# Patient Record
Sex: MALE | Race: WHITE | NOT HISPANIC OR LATINO | Employment: FULL TIME | URBAN - METROPOLITAN AREA
[De-identification: names, ages, dates, MRNs, and addresses within clinical notes are randomized per-mention and may not be internally consistent; named-entity substitution may affect disease eponyms.]

---

## 2017-09-25 ENCOUNTER — ALLSCRIPTS OFFICE VISIT (OUTPATIENT)
Dept: OTHER | Facility: OTHER | Age: 27
End: 2017-09-25

## 2017-09-25 DIAGNOSIS — J30.1 ALLERGIC RHINITIS DUE TO POLLEN: ICD-10-CM

## 2017-09-25 DIAGNOSIS — Z13.220 ENCOUNTER FOR SCREENING FOR LIPOID DISORDERS: ICD-10-CM

## 2017-10-03 ENCOUNTER — LAB CONVERSION - ENCOUNTER (OUTPATIENT)
Dept: OTHER | Facility: OTHER | Age: 27
End: 2017-10-03

## 2017-10-03 LAB
A/G RATIO (HISTORICAL): 2.2 (CALC) (ref 1–2.5)
ALBUMIN SERPL BCP-MCNC: 4.8 G/DL (ref 3.6–5.1)
ALP SERPL-CCNC: 62 U/L (ref 40–115)
ALT SERPL W P-5'-P-CCNC: 33 U/L (ref 9–46)
AST SERPL W P-5'-P-CCNC: 19 U/L (ref 10–40)
BASOPHILS # BLD AUTO: 0.3 %
BASOPHILS # BLD AUTO: 23 CELLS/UL (ref 0–200)
BILIRUB SERPL-MCNC: 0.9 MG/DL (ref 0.2–1.2)
BUN SERPL-MCNC: 14 MG/DL (ref 7–25)
BUN/CREA RATIO (HISTORICAL): NORMAL (CALC) (ref 6–22)
CALCIUM SERPL-MCNC: 9.7 MG/DL (ref 8.6–10.3)
CHLORIDE SERPL-SCNC: 102 MMOL/L (ref 98–110)
CHOLEST SERPL-MCNC: 182 MG/DL
CHOLEST/HDLC SERPL: 4.4 (CALC)
CO2 SERPL-SCNC: 29 MMOL/L (ref 20–31)
CREAT SERPL-MCNC: 0.89 MG/DL (ref 0.6–1.35)
DEPRECATED RDW RBC AUTO: 12.6 % (ref 11–15)
EGFR AFRICAN AMERICAN (HISTORICAL): 137 ML/MIN/1.73M2
EGFR-AMERICAN CALC (HISTORICAL): 118 ML/MIN/1.73M2
EOSINOPHIL # BLD AUTO: 1.7 %
EOSINOPHIL # BLD AUTO: 129 CELLS/UL (ref 15–500)
GAMMA GLOBULIN (HISTORICAL): 2.2 G/DL (CALC) (ref 1.9–3.7)
GLUCOSE (HISTORICAL): 85 MG/DL (ref 65–99)
HCT VFR BLD AUTO: 47 % (ref 38.5–50)
HDLC SERPL-MCNC: 41 MG/DL
HGB BLD-MCNC: 15.9 G/DL (ref 13.2–17.1)
LDL CHOLESTEROL (HISTORICAL): 126 MG/DL (CALC)
LYMPHOCYTES # BLD AUTO: 2447 CELLS/UL (ref 850–3900)
LYMPHOCYTES # BLD AUTO: 32.2 %
MCH RBC QN AUTO: 29.6 PG (ref 27–33)
MCHC RBC AUTO-ENTMCNC: 33.8 G/DL (ref 32–36)
MCV RBC AUTO: 87.4 FL (ref 80–100)
MONOCYTES # BLD AUTO: 661 CELLS/UL (ref 200–950)
MONOCYTES (HISTORICAL): 8.7 %
NEUTROPHILS # BLD AUTO: 4340 CELLS/UL (ref 1500–7800)
NEUTROPHILS # BLD AUTO: 57.1 %
NON-HDL-CHOL (CHOL-HDL) (HISTORICAL): 141 MG/DL (CALC)
PLATELET # BLD AUTO: 266 THOUSAND/UL (ref 140–400)
PMV BLD AUTO: 10.8 FL (ref 7.5–12.5)
POTASSIUM SERPL-SCNC: 4.3 MMOL/L (ref 3.5–5.3)
RBC # BLD AUTO: 5.38 MILLION/UL (ref 4.2–5.8)
SODIUM SERPL-SCNC: 139 MMOL/L (ref 135–146)
TOTAL PROTEIN (HISTORICAL): 7 G/DL (ref 6.1–8.1)
TRIGL SERPL-MCNC: 54 MG/DL
TSH SERPL DL<=0.05 MIU/L-ACNC: 1.61 MIU/L (ref 0.4–4.5)
WBC # BLD AUTO: 7.6 THOUSAND/UL (ref 3.8–10.8)

## 2018-01-12 VITALS
OXYGEN SATURATION: 97 % | DIASTOLIC BLOOD PRESSURE: 74 MMHG | BODY MASS INDEX: 28.91 KG/M2 | RESPIRATION RATE: 16 BRPM | HEART RATE: 68 BPM | SYSTOLIC BLOOD PRESSURE: 128 MMHG | HEIGHT: 69 IN | WEIGHT: 195.2 LBS | TEMPERATURE: 98 F

## 2019-01-21 ENCOUNTER — OFFICE VISIT (OUTPATIENT)
Dept: URGENT CARE | Age: 29
End: 2019-01-21
Payer: COMMERCIAL

## 2019-01-21 VITALS
SYSTOLIC BLOOD PRESSURE: 123 MMHG | TEMPERATURE: 100.3 F | OXYGEN SATURATION: 96 % | BODY MASS INDEX: 29.55 KG/M2 | HEART RATE: 103 BPM | DIASTOLIC BLOOD PRESSURE: 59 MMHG | RESPIRATION RATE: 20 BRPM | WEIGHT: 206.4 LBS | HEIGHT: 70 IN

## 2019-01-21 DIAGNOSIS — R68.89 FLU-LIKE SYMPTOMS: Primary | ICD-10-CM

## 2019-01-21 PROCEDURE — 99203 OFFICE O/P NEW LOW 30 MIN: CPT | Performed by: PHYSICIAN ASSISTANT

## 2019-01-21 RX ORDER — TRIAMCINOLONE ACETONIDE 55 UG/1
1 SPRAY, METERED NASAL DAILY
COMMUNITY

## 2019-01-21 RX ORDER — OSELTAMIVIR PHOSPHATE 75 MG/1
75 CAPSULE ORAL EVERY 12 HOURS SCHEDULED
Qty: 10 CAPSULE | Refills: 0 | Status: SHIPPED | OUTPATIENT
Start: 2019-01-21 | End: 2019-01-26

## 2019-01-21 RX ORDER — CETIRIZINE HYDROCHLORIDE 10 MG/1
1 TABLET ORAL DAILY
COMMUNITY

## 2019-01-21 NOTE — PROGRESS NOTES
[facility]        NAME: Kenna Wallace is a 33 y o  male  : 1990    MRN: [id number]  DATE: 2019  TIME: 6:23 PM    Assessment and Plan   Flu-like symptoms [R68 89]  1  Flu-like symptoms  oseltamivir (TAMIFLU) 75 mg capsule       In light of prior year's serious flu complications, emphasized symptoms in which to go to the ED  Patient verbalized understanding  Patient Instructions     Take Tamiflu as prescribed  Fluids and rest  Tylenol/Ibuprofen for pain/fever  Monitor for worsening symptoms   Follow up with PCP in 3-5 days  Proceed to  ER if symptoms worsen  Chief Complaint     Chief Complaint   Patient presents with    Cold Like Symptoms     Pt reports he started with a cough, achiness, chills and fever today  Took Robitussin and Motrin  Last dose of Motrin at 4:30  Wife diagnosed with flu  History of Present Illness       URI    This is a new problem  The current episode started today  The problem has been unchanged  The maximum temperature recorded prior to his arrival was 100 4 - 100 9 F  Associated symptoms include coughing (productive) and headaches  Pertinent negatives include no abdominal pain, chest pain, congestion, diarrhea, ear pain, nausea, rash, rhinorrhea, sinus pain, sneezing, sore throat, vomiting or wheezing  Treatments tried: robitussin; motrin 4:30pm  The treatment provided mild relief  Review of Systems   Review of Systems   Constitutional: Positive for chills, fatigue and fever  Negative for activity change and appetite change  HENT: Negative for congestion, dental problem, ear discharge, ear pain, facial swelling, postnasal drip, rhinorrhea, sinus pain, sinus pressure, sneezing, sore throat and trouble swallowing  Eyes: Negative for itching  Respiratory: Positive for cough (productive)  Negative for chest tightness, shortness of breath and wheezing  Cardiovascular: Negative for chest pain and palpitations     Gastrointestinal: Negative for abdominal pain, constipation, diarrhea, nausea and vomiting  Musculoskeletal: Positive for myalgias  Skin: Negative for rash  Neurological: Positive for headaches  Negative for dizziness, weakness and light-headedness  Current Medications       Current Outpatient Prescriptions:     cetirizine (ZYRTEC ALLERGY) 10 mg tablet, Take 1 tablet by mouth daily, Disp: , Rfl:     Multiple Vitamins-Minerals (MULTIVITAMIN ADULT EXTRA C PO), Take 1 tablet by mouth daily, Disp: , Rfl:     oseltamivir (TAMIFLU) 75 mg capsule, Take 1 capsule (75 mg total) by mouth every 12 (twelve) hours for 5 days, Disp: 10 capsule, Rfl: 0    Triamcinolone Acetonide (NASACORT ALLERGY 24HR) 55 MCG/ACT AERO, 1 spray into each nostril daily, Disp: , Rfl:     Current Allergies     Allergies as of 01/21/2019 - Reviewed 01/21/2019   Allergen Reaction Noted    Amoxicillin Edema 09/25/2017            The following portions of the patient's history were reviewed and updated as appropriate: allergies, current medications, past family history, past medical history, past social history, past surgical history and problem list      Past Medical History:   Diagnosis Date    Allergic        Past Surgical History:   Procedure Laterality Date    WISDOM TOOTH EXTRACTION         History reviewed  No pertinent family history  Medications have been verified  Objective   /59   Pulse 103   Temp 100 3 °F (37 9 °C)   Resp 20   Ht 5' 10" (1 778 m)   Wt 93 6 kg (206 lb 6 4 oz)   SpO2 96%   BMI 29 62 kg/m²        Physical Exam     Physical Exam   Constitutional: He is oriented to person, place, and time  He appears well-developed and well-nourished  No distress  HENT:   Head: Normocephalic  Right Ear: External ear normal    Left Ear: External ear normal    Nose: Nose normal    Mouth/Throat: Oropharynx is clear and moist  No oropharyngeal exudate     Eyes: Conjunctivae are normal    Cardiovascular: Normal rate, regular rhythm, normal heart sounds and intact distal pulses  Exam reveals no gallop and no friction rub  No murmur heard  Pulmonary/Chest: Effort normal and breath sounds normal  No respiratory distress  He has no wheezes  He has no rales  He exhibits no tenderness  Lymphadenopathy:     He has no cervical adenopathy  Neurological: He is alert and oriented to person, place, and time  Skin: Skin is warm  He is not diaphoretic  Psychiatric: He has a normal mood and affect  His behavior is normal  Judgment and thought content normal    Vitals reviewed

## 2019-01-21 NOTE — PATIENT INSTRUCTIONS
Take Tamiflu as prescribed  Fluids and rest  Tylenol/Ibuprofen for pain/fever  Monitor for worsening symptoms   Follow up with PCP in 3-5 days  Proceed to  ER if symptoms worsen  Influenza   WHAT YOU NEED TO KNOW:   Influenza (the flu) is an infection caused by the influenza virus  The flu is easily spread when an infected person coughs, sneezes, or has close contact with others  You may be able to spread the flu to others for 1 week or longer after signs or symptoms appear  DISCHARGE INSTRUCTIONS:   Call 911 for any of the following:   · You have trouble breathing, and your lips look purple or blue  · You have a seizure  Return to the emergency department if:   · You are dizzy, or you are urinating less or not at all  · You have a headache with a stiff neck, and you feel tired or confused  · You have new pain or pressure in your chest     · Your symptoms, such as shortness of breath, vomiting, or diarrhea, get worse  · Your symptoms, such as fever and coughing, seem to get better, but then get worse  Contact your healthcare provider if:   · You have new muscle pain or weakness  · You have questions or concerns about your condition or care  Medicines: You may need any of the following:  · Acetaminophen  decreases pain and fever  It is available without a doctor's order  Ask how much to take and how often to take it  Follow directions  Acetaminophen can cause liver damage if not taken correctly  · NSAIDs , such as ibuprofen, help decrease swelling, pain, and fever  This medicine is available with or without a doctor's order  NSAIDs can cause stomach bleeding or kidney problems in certain people  If you take blood thinner medicine, always ask your healthcare provider if NSAIDs are safe for you  Always read the medicine label and follow directions  · Antivirals  help fight a viral infection  · Take your medicine as directed    Contact your healthcare provider if you think your medicine is not helping or if you have side effects  Tell him or her if you are allergic to any medicine  Keep a list of the medicines, vitamins, and herbs you take  Include the amounts, and when and why you take them  Bring the list or the pill bottles to follow-up visits  Carry your medicine list with you in case of an emergency  Rest  as much as you can to help you recover  Drink liquids as directed  to help prevent dehydration  Ask how much liquid to drink each day and which liquids are best for you  Prevent the spread of influenza:   · Wash your hands often  Use soap and water  Wash your hands after you use the bathroom, change a child's diapers, or sneeze  Wash your hands before you prepare or eat food  Use gel hand cleanser when soap and water are not available  Do not touch your eyes, nose, or mouth unless you have washed your hands first            · Cover your mouth when you sneeze or cough  Cough into a tissue or the bend of your arm  · Clean shared items with a germ-killing   Clean table surfaces, doorknobs, and light switches  Do not share towels, silverware, and dishes with people who are sick  Wash bed sheets, towels, silverware, and dishes with soap and water  · Wear a mask  over your mouth and nose if you are sick or are near anyone who is sick  · Stay away from others  if you are sick  · Influenza vaccine  helps prevent influenza (flu)  Everyone older than 6 months should get a yearly influenza vaccine  Get the vaccine as soon as it is available, usually in September or October each year  Follow up with your healthcare provider as directed:  Write down your questions so you remember to ask them during your visits  © 2017 [facility] Information is for End User's use only and may not be sold, redistributed or otherwise used for commercial purposes   All illustrations and images included in CareNotes® are the copyrighted property of [employer]  or [employer] Nune[employer]  The above information is an  only  It is not intended as medical advice for individual conditions or treatments  Talk to your doctor, nurse or pharmacist before following any medical regimen to see if it is safe and effective for you

## 2019-01-21 NOTE — LETTER
January 21, 2019     Patient: Aftab Tillman   YOB: 1990   Date of Visit: 1/21/2019       To Whom It May Concern: It is my medical opinion that Seda Dodd may return to work on 1/25/2019  If you have any questions or concerns, please don't hesitate to call           Sincerely,        Sofie Mae PA-C    CC: No Recipients

## 2019-03-19 ENCOUNTER — OFFICE VISIT (OUTPATIENT)
Dept: INTERNAL MEDICINE CLINIC | Age: 29
End: 2019-03-19
Payer: COMMERCIAL

## 2019-03-19 VITALS
HEIGHT: 69 IN | HEART RATE: 80 BPM | OXYGEN SATURATION: 96 % | BODY MASS INDEX: 30.21 KG/M2 | WEIGHT: 204 LBS | TEMPERATURE: 98.8 F | DIASTOLIC BLOOD PRESSURE: 58 MMHG | SYSTOLIC BLOOD PRESSURE: 104 MMHG

## 2019-03-19 DIAGNOSIS — D22.9 CHANGE IN MOLE: Primary | ICD-10-CM

## 2019-03-19 PROCEDURE — 3008F BODY MASS INDEX DOCD: CPT | Performed by: NURSE PRACTITIONER

## 2019-03-19 PROCEDURE — 99213 OFFICE O/P EST LOW 20 MIN: CPT | Performed by: NURSE PRACTITIONER

## 2019-03-19 NOTE — ASSESSMENT & PLAN NOTE
Discussed with Dr Benji Castaneda who also  examined patient  Will advise patient to use Neosporin once or twice a day to site, and follow up with our office in about 2-3 weeks for possible removal   Discussed signs and symptoms of infection when he should report back to our office

## 2019-03-19 NOTE — PROGRESS NOTES
Assessment/Plan:    Change in mole  Discussed with Dr Amparo Raines who also  examined patient  Will advise patient to use Neosporin once or twice a day to site, and follow up with our office in about 2-3 weeks for possible removal   Discussed signs and symptoms of infection when he should report back to our office  Diagnoses and all orders for this visit:    Change in mole          Subjective:      Patient ID: Joshua Brooks is a 33 y o  male  Patient presents today with a change in his mole on his right forearm  Patient reports that there is some erythema around the mole, patient has not used anything on it or has not changed anything  Patient tries to wear sunscreen to prevent sunburn  Patient states he noticed a change as well on Sunday, it was erythematous and darker and pigment, patient denies pain or drainage from this site  Patient denies any recent bug bites  Patient denies any recent scabbing or abnormal bleeding to the site of the mole  The following portions of the patient's history were reviewed and updated as appropriate: allergies, current medications, past family history, past medical history, past social history, past surgical history and problem list     Review of Systems   Constitutional: Negative for activity change, appetite change, chills, diaphoresis and fever  HENT: Negative for congestion, ear discharge, ear pain, postnasal drip, rhinorrhea, sinus pressure, sinus pain and sore throat  Eyes: Negative for pain, discharge, itching and visual disturbance  Respiratory: Negative for cough, chest tightness, shortness of breath and wheezing  Cardiovascular: Negative for chest pain, palpitations and leg swelling  Gastrointestinal: Negative for abdominal pain, constipation, diarrhea, nausea and vomiting  Endocrine: Negative for polydipsia, polyphagia and polyuria  Genitourinary: Negative for difficulty urinating, dysuria and urgency     Musculoskeletal: Negative for arthralgias, back pain and neck pain  Change to mole on right forearm    Skin: Negative for rash and wound  Neurological: Negative for dizziness, weakness, numbness and headaches  Past Medical History:   Diagnosis Date    Allergic          Current Outpatient Medications:     cetirizine (ZYRTEC ALLERGY) 10 mg tablet, Take 1 tablet by mouth daily, Disp: , Rfl:     Multiple Vitamins-Minerals (MULTIVITAMIN ADULT EXTRA C PO), Take 1 tablet by mouth daily, Disp: , Rfl:     Triamcinolone Acetonide (NASACORT ALLERGY 24HR) 55 MCG/ACT AERO, 1 spray into each nostril daily, Disp: , Rfl:     Allergies   Allergen Reactions    Amoxicillin Edema     Annotation - [id number]: redness and swelling around ears  William Quijano Other Abdominal Pain     Seasonal- watery eyes, sinus problems, congestion         Social History   Past Surgical History:   Procedure Laterality Date    WISDOM TOOTH EXTRACTION       No family history on file  Objective:  /58 (BP Location: Left arm, Patient Position: Sitting, Cuff Size: Standard)   Pulse 80   Temp 98 8 °F (37 1 °C) (Tympanic)   Ht 5' 8 5" (1 74 m) Comment: shoes off  Wt 92 5 kg (204 lb) Comment: shoes off  SpO2 96%   BMI 30 56 kg/m²     No results found for this or any previous visit (from the past 1344 hour(s))  Physical Exam   Constitutional: He is oriented to person, place, and time  He appears well-developed and well-nourished  No distress  HENT:   Head: Normocephalic and atraumatic  Right Ear: External ear normal    Left Ear: External ear normal    Nose: Nose normal    Mouth/Throat: Oropharynx is clear and moist  No oropharyngeal exudate  Eyes: Pupils are equal, round, and reactive to light  Conjunctivae and EOM are normal  Right eye exhibits no discharge  Left eye exhibits no discharge  Neck: Normal range of motion  Neck supple  No thyromegaly present  Cardiovascular: Normal rate, regular rhythm, normal heart sounds and intact distal pulses  Exam reveals no gallop and no friction rub  No murmur heard  Pulmonary/Chest: Effort normal and breath sounds normal  No stridor  No respiratory distress  He has no wheezes  He has no rales  Abdominal: Soft  Bowel sounds are normal  He exhibits no distension  There is no tenderness  Lymphadenopathy:     He has no cervical adenopathy  Neurological: He is alert and oriented to person, place, and time  Skin: Skin is warm and dry  No rash noted  He is not diaphoretic  No erythema  Psychiatric: He has a normal mood and affect  His behavior is normal  Judgment and thought content normal    Picture was obtained of mole, reaching out to MA to attach to patients chart

## 2019-10-21 ENCOUNTER — CLINICAL SUPPORT (OUTPATIENT)
Dept: INTERNAL MEDICINE CLINIC | Age: 29
End: 2019-10-21
Payer: COMMERCIAL

## 2019-10-21 DIAGNOSIS — Z23 NEED FOR INFLUENZA VACCINATION: Primary | ICD-10-CM

## 2019-10-21 PROCEDURE — 90686 IIV4 VACC NO PRSV 0.5 ML IM: CPT

## 2019-10-21 PROCEDURE — 90471 IMMUNIZATION ADMIN: CPT

## 2020-02-27 ENCOUNTER — TELEPHONE (OUTPATIENT)
Dept: INTERNAL MEDICINE CLINIC | Age: 30
End: 2020-02-27

## 2020-05-15 ENCOUNTER — OFFICE VISIT (OUTPATIENT)
Dept: INTERNAL MEDICINE CLINIC | Age: 30
End: 2020-05-15
Payer: COMMERCIAL

## 2020-05-15 VITALS
BODY MASS INDEX: 30.78 KG/M2 | OXYGEN SATURATION: 98 % | SYSTOLIC BLOOD PRESSURE: 126 MMHG | TEMPERATURE: 97.9 F | HEIGHT: 69 IN | DIASTOLIC BLOOD PRESSURE: 82 MMHG | WEIGHT: 207.8 LBS | HEART RATE: 98 BPM

## 2020-05-15 DIAGNOSIS — R53.83 FATIGUE, UNSPECIFIED TYPE: ICD-10-CM

## 2020-05-15 DIAGNOSIS — Z82.49 FAMILY HISTORY OF CARDIOVASCULAR DISEASE: ICD-10-CM

## 2020-05-15 DIAGNOSIS — F41.9 ANXIETY: ICD-10-CM

## 2020-05-15 DIAGNOSIS — Z00.00 PHYSICAL EXAM: Primary | ICD-10-CM

## 2020-05-15 DIAGNOSIS — Z13.220 SCREENING CHOLESTEROL LEVEL: ICD-10-CM

## 2020-05-15 PROCEDURE — 99395 PREV VISIT EST AGE 18-39: CPT | Performed by: FAMILY MEDICINE

## 2020-05-22 ENCOUNTER — APPOINTMENT (OUTPATIENT)
Dept: LAB | Age: 30
End: 2020-05-22
Payer: COMMERCIAL

## 2020-05-22 DIAGNOSIS — Z82.49 FAMILY HISTORY OF CARDIOVASCULAR DISEASE: ICD-10-CM

## 2020-05-22 DIAGNOSIS — R53.83 FATIGUE, UNSPECIFIED TYPE: ICD-10-CM

## 2020-05-22 DIAGNOSIS — F41.9 ANXIETY: ICD-10-CM

## 2020-05-22 DIAGNOSIS — Z13.220 SCREENING CHOLESTEROL LEVEL: ICD-10-CM

## 2020-05-22 DIAGNOSIS — Z00.00 PHYSICAL EXAM: ICD-10-CM

## 2020-05-22 LAB
25(OH)D3 SERPL-MCNC: 24 NG/ML (ref 30–100)
ALBUMIN SERPL BCP-MCNC: 4.1 G/DL (ref 3.5–5)
ALP SERPL-CCNC: 76 U/L (ref 46–116)
ALT SERPL W P-5'-P-CCNC: 57 U/L (ref 12–78)
ANION GAP SERPL CALCULATED.3IONS-SCNC: 3 MMOL/L (ref 4–13)
AST SERPL W P-5'-P-CCNC: 20 U/L (ref 5–45)
BILIRUB SERPL-MCNC: 0.7 MG/DL (ref 0.2–1)
BUN SERPL-MCNC: 12 MG/DL (ref 5–25)
CALCIUM SERPL-MCNC: 8.9 MG/DL (ref 8.3–10.1)
CHLORIDE SERPL-SCNC: 104 MMOL/L (ref 100–108)
CHOLEST SERPL-MCNC: 195 MG/DL (ref 50–200)
CO2 SERPL-SCNC: 30 MMOL/L (ref 21–32)
CREAT SERPL-MCNC: 0.9 MG/DL (ref 0.6–1.3)
GFR SERPL CREATININE-BSD FRML MDRD: 116 ML/MIN/1.73SQ M
GLUCOSE P FAST SERPL-MCNC: 99 MG/DL (ref 65–99)
HDLC SERPL-MCNC: 36 MG/DL
LDLC SERPL CALC-MCNC: 142 MG/DL (ref 0–100)
NONHDLC SERPL-MCNC: 159 MG/DL
POTASSIUM SERPL-SCNC: 3.5 MMOL/L (ref 3.5–5.3)
PROT SERPL-MCNC: 7.3 G/DL (ref 6.4–8.2)
SODIUM SERPL-SCNC: 137 MMOL/L (ref 136–145)
TRIGL SERPL-MCNC: 86 MG/DL
TSH SERPL DL<=0.05 MIU/L-ACNC: 1.71 UIU/ML (ref 0.36–3.74)

## 2020-05-22 PROCEDURE — 82306 VITAMIN D 25 HYDROXY: CPT

## 2020-05-22 PROCEDURE — 80061 LIPID PANEL: CPT

## 2020-05-22 PROCEDURE — 36415 COLL VENOUS BLD VENIPUNCTURE: CPT

## 2020-05-22 PROCEDURE — 80053 COMPREHEN METABOLIC PANEL: CPT

## 2020-05-22 PROCEDURE — 84443 ASSAY THYROID STIM HORMONE: CPT

## 2020-06-22 ENCOUNTER — OFFICE VISIT (OUTPATIENT)
Dept: INTERNAL MEDICINE CLINIC | Age: 30
End: 2020-06-22
Payer: COMMERCIAL

## 2020-06-22 VITALS
HEIGHT: 69 IN | BODY MASS INDEX: 31.55 KG/M2 | OXYGEN SATURATION: 98 % | DIASTOLIC BLOOD PRESSURE: 82 MMHG | WEIGHT: 213 LBS | TEMPERATURE: 98.5 F | HEART RATE: 86 BPM | SYSTOLIC BLOOD PRESSURE: 122 MMHG

## 2020-06-22 DIAGNOSIS — F41.9 ANXIETY: ICD-10-CM

## 2020-06-22 DIAGNOSIS — E66.09 CLASS 1 OBESITY DUE TO EXCESS CALORIES WITHOUT SERIOUS COMORBIDITY WITH BODY MASS INDEX (BMI) OF 31.0 TO 31.9 IN ADULT: ICD-10-CM

## 2020-06-22 DIAGNOSIS — E78.00 HYPERCHOLESTEREMIA: Primary | ICD-10-CM

## 2020-06-22 DIAGNOSIS — E55.9 VITAMIN D DEFICIENCY: ICD-10-CM

## 2020-06-22 PROBLEM — E66.811 CLASS 1 OBESITY DUE TO EXCESS CALORIES WITHOUT SERIOUS COMORBIDITY WITH BODY MASS INDEX (BMI) OF 31.0 TO 31.9 IN ADULT: Status: ACTIVE | Noted: 2020-06-22

## 2020-06-22 PROCEDURE — 1036F TOBACCO NON-USER: CPT | Performed by: FAMILY MEDICINE

## 2020-06-22 PROCEDURE — 3008F BODY MASS INDEX DOCD: CPT | Performed by: FAMILY MEDICINE

## 2020-06-22 PROCEDURE — 99214 OFFICE O/P EST MOD 30 MIN: CPT | Performed by: FAMILY MEDICINE

## 2021-04-05 DIAGNOSIS — Z23 ENCOUNTER FOR IMMUNIZATION: ICD-10-CM

## 2021-04-13 ENCOUNTER — IMMUNIZATIONS (OUTPATIENT)
Dept: FAMILY MEDICINE CLINIC | Facility: HOSPITAL | Age: 31
End: 2021-04-13

## 2021-04-13 DIAGNOSIS — Z23 ENCOUNTER FOR IMMUNIZATION: Primary | ICD-10-CM

## 2021-04-13 PROCEDURE — 91300 SARS-COV-2 / COVID-19 MRNA VACCINE (PFIZER-BIONTECH) 30 MCG: CPT

## 2021-04-13 PROCEDURE — 0001A SARS-COV-2 / COVID-19 MRNA VACCINE (PFIZER-BIONTECH) 30 MCG: CPT

## 2021-05-06 ENCOUNTER — IMMUNIZATIONS (OUTPATIENT)
Dept: FAMILY MEDICINE CLINIC | Facility: HOSPITAL | Age: 31
End: 2021-05-06

## 2021-05-06 DIAGNOSIS — Z23 ENCOUNTER FOR IMMUNIZATION: Primary | ICD-10-CM

## 2021-05-06 PROCEDURE — 91300 SARS-COV-2 / COVID-19 MRNA VACCINE (PFIZER-BIONTECH) 30 MCG: CPT

## 2021-05-06 PROCEDURE — 0002A SARS-COV-2 / COVID-19 MRNA VACCINE (PFIZER-BIONTECH) 30 MCG: CPT

## 2021-07-09 NOTE — PRE-PROCEDURE INSTRUCTIONS
Pre-Surgery Instructions:   Medication Instructions    cetirizine (ZYRTEC ALLERGY) 10 mg tablet taking DOS     Multiple Vitamins-Minerals (MULTIVITAMIN ADULT EXTRA C PO) LD 7/9 will resume after DOS     Triamcinolone Acetonide (NASACORT ALLERGY 24HR) 55 MCG/ACT AERO taking DOS     Vitamin Med Class    You may continue to take any vitamin that your surgeon has prescribed to you up to the day before surgery  If your surgeon has not specifically prescribed this vitamin or instructed you to continue then stop taking 7 days prior to surgery      Reviewed the following with the Pt & his wife  Med insts including SCARLET [facility] gave Pt for 7/15 & 16  Showering insts using DIAL soap DOS  NPO at [facility] for DOS 7/16  Advised of [facility] call 7/15 from [phone] with final DOS details  Pt & his wife both verbalized understanding to all of the above

## 2021-07-15 NOTE — H&P
This almost 28year old gentleman is for elective sterilization vasectomy  He had a trial vasectomy on left side which could not be done under local due to anatomic reasons  He is now in for under anesthesia bilateral vasectomy  This gentleman is allergic to Amoxicillin  He has had wisdom tooth removal in the past   Physical exam chest clear to IPP&A  No jaundice  HEENT exam physiologic  Lungs clear  Heart normal sinus rhythm or megaly  BP is 120/80 Pulse is 72  Abdomen exam benign  Recent small incision left anterior hemiscrotum well healed  Bowel sounds normal  Extremities no edema good pulses   IMPRESSION: Sterilization under general anesthesia

## 2021-07-16 ENCOUNTER — HOSPITAL ENCOUNTER (OUTPATIENT)
Facility: HOSPITAL | Age: 31
Setting detail: OUTPATIENT SURGERY
Discharge: HOME | End: 2021-07-16
Attending: UROLOGY | Admitting: UROLOGY
Payer: COMMERCIAL

## 2021-07-16 ENCOUNTER — ANESTHESIA EVENT (OUTPATIENT)
Dept: PERIOP | Facility: HOSPITAL | Age: 31
End: 2021-07-16
Payer: COMMERCIAL

## 2021-07-16 ENCOUNTER — ANESTHESIA (OUTPATIENT)
Dept: PERIOP | Facility: HOSPITAL | Age: 31
End: 2021-07-16
Payer: COMMERCIAL

## 2021-07-16 VITALS
OXYGEN SATURATION: 98 % | WEIGHT: 212 LBS | TEMPERATURE: 97.5 F | DIASTOLIC BLOOD PRESSURE: 67 MMHG | BODY MASS INDEX: 30.35 KG/M2 | RESPIRATION RATE: 16 BRPM | HEART RATE: 56 BPM | SYSTOLIC BLOOD PRESSURE: 143 MMHG | HEIGHT: 70 IN

## 2021-07-16 DIAGNOSIS — Z30.2 ENCOUNTER FOR STERILIZATION: ICD-10-CM

## 2021-07-16 PROCEDURE — 88302 TISSUE EXAM BY PATHOLOGIST: CPT | Performed by: PATHOLOGY

## 2021-07-16 RX ORDER — QUINIDINE SULFATE 200 MG
TABLET ORAL
COMMUNITY

## 2021-07-16 RX ORDER — PROMETHAZINE HYDROCHLORIDE 25 MG/ML
12.5 INJECTION, SOLUTION INTRAMUSCULAR; INTRAVENOUS ONCE AS NEEDED
Status: DISCONTINUED | OUTPATIENT
Start: 2021-07-16 | End: 2021-07-16 | Stop reason: HOSPADM

## 2021-07-16 RX ORDER — ZINC GLUCONATE 50 MG
50 TABLET ORAL DAILY
COMMUNITY

## 2021-07-16 RX ORDER — HYDROMORPHONE HCL/PF 1 MG/ML
0.5 SYRINGE (ML) INJECTION
Status: DISCONTINUED | OUTPATIENT
Start: 2021-07-16 | End: 2021-07-16 | Stop reason: HOSPADM

## 2021-07-16 RX ORDER — ALBUTEROL SULFATE 2.5 MG/3ML
2.5 SOLUTION RESPIRATORY (INHALATION) ONCE AS NEEDED
Status: DISCONTINUED | OUTPATIENT
Start: 2021-07-16 | End: 2021-07-16 | Stop reason: HOSPADM

## 2021-07-16 RX ORDER — ONDANSETRON 2 MG/ML
4 INJECTION INTRAMUSCULAR; INTRAVENOUS ONCE AS NEEDED
Status: DISCONTINUED | OUTPATIENT
Start: 2021-07-16 | End: 2021-07-16 | Stop reason: HOSPADM

## 2021-07-16 RX ORDER — CIPROFLOXACIN 2 MG/ML
400 INJECTION, SOLUTION INTRAVENOUS ONCE
Status: DISCONTINUED | OUTPATIENT
Start: 2021-07-16 | End: 2021-07-16 | Stop reason: HOSPADM

## 2021-07-16 RX ORDER — ONDANSETRON 2 MG/ML
INJECTION INTRAMUSCULAR; INTRAVENOUS AS NEEDED
Status: DISCONTINUED | OUTPATIENT
Start: 2021-07-16 | End: 2021-07-16

## 2021-07-16 RX ORDER — PROMETHAZINE HYDROCHLORIDE 25 MG/ML
12.5 INJECTION, SOLUTION INTRAMUSCULAR; INTRAVENOUS ONCE AS NEEDED
Status: DISCONTINUED | OUTPATIENT
Start: 2021-07-16 | End: 2021-07-16

## 2021-07-16 RX ORDER — FENTANYL CITRATE/PF 50 MCG/ML
25 SYRINGE (ML) INJECTION
Status: DISCONTINUED | OUTPATIENT
Start: 2021-07-16 | End: 2021-07-16 | Stop reason: HOSPADM

## 2021-07-16 RX ORDER — FENTANYL CITRATE 50 UG/ML
INJECTION, SOLUTION INTRAMUSCULAR; INTRAVENOUS AS NEEDED
Status: DISCONTINUED | OUTPATIENT
Start: 2021-07-16 | End: 2021-07-16

## 2021-07-16 RX ORDER — SODIUM CHLORIDE, SODIUM LACTATE, POTASSIUM CHLORIDE, CALCIUM CHLORIDE 600; 310; 30; 20 MG/100ML; MG/100ML; MG/100ML; MG/100ML
INJECTION, SOLUTION INTRAVENOUS CONTINUOUS PRN
Status: DISCONTINUED | OUTPATIENT
Start: 2021-07-16 | End: 2021-07-16

## 2021-07-16 RX ORDER — KETOROLAC TROMETHAMINE 30 MG/ML
INJECTION, SOLUTION INTRAMUSCULAR; INTRAVENOUS AS NEEDED
Status: DISCONTINUED | OUTPATIENT
Start: 2021-07-16 | End: 2021-07-16

## 2021-07-16 RX ORDER — MIDAZOLAM HYDROCHLORIDE 2 MG/2ML
INJECTION, SOLUTION INTRAMUSCULAR; INTRAVENOUS AS NEEDED
Status: DISCONTINUED | OUTPATIENT
Start: 2021-07-16 | End: 2021-07-16

## 2021-07-16 RX ORDER — BACITRACIN, NEOMYCIN, POLYMYXIN B 400; 3.5; 5 [USP'U]/G; MG/G; [USP'U]/G
OINTMENT TOPICAL AS NEEDED
Status: DISCONTINUED | OUTPATIENT
Start: 2021-07-16 | End: 2021-07-16 | Stop reason: HOSPADM

## 2021-07-16 RX ORDER — LIDOCAINE HYDROCHLORIDE 10 MG/ML
INJECTION, SOLUTION EPIDURAL; INFILTRATION; INTRACAUDAL; PERINEURAL AS NEEDED
Status: DISCONTINUED | OUTPATIENT
Start: 2021-07-16 | End: 2021-07-16 | Stop reason: HOSPADM

## 2021-07-16 RX ORDER — PROPOFOL 10 MG/ML
INJECTION, EMULSION INTRAVENOUS AS NEEDED
Status: DISCONTINUED | OUTPATIENT
Start: 2021-07-16 | End: 2021-07-16

## 2021-07-16 RX ORDER — MULTIVIT WITH MINERALS/LUTEIN
1000 TABLET ORAL DAILY
COMMUNITY

## 2021-07-16 RX ORDER — MEPERIDINE HYDROCHLORIDE 25 MG/ML
12.5 INJECTION INTRAMUSCULAR; INTRAVENOUS; SUBCUTANEOUS
Status: DISCONTINUED | OUTPATIENT
Start: 2021-07-16 | End: 2021-07-16 | Stop reason: HOSPADM

## 2021-07-16 RX ORDER — LIDOCAINE HYDROCHLORIDE 10 MG/ML
INJECTION, SOLUTION EPIDURAL; INFILTRATION; INTRACAUDAL; PERINEURAL AS NEEDED
Status: DISCONTINUED | OUTPATIENT
Start: 2021-07-16 | End: 2021-07-16

## 2021-07-16 RX ORDER — CIPROFLOXACIN 500 MG/1
500 TABLET, FILM COATED ORAL EVERY 12 HOURS SCHEDULED
COMMUNITY

## 2021-07-16 RX ADMIN — SODIUM CHLORIDE, SODIUM LACTATE, POTASSIUM CHLORIDE, AND CALCIUM CHLORIDE: .6; .31; .03; .02 INJECTION, SOLUTION INTRAVENOUS at 07:51

## 2021-07-16 RX ADMIN — LIDOCAINE HYDROCHLORIDE 50 MG: 10 INJECTION, SOLUTION EPIDURAL; INFILTRATION; INTRACAUDAL; PERINEURAL at 07:53

## 2021-07-16 RX ADMIN — PROPOFOL 200 MG: 10 INJECTION, EMULSION INTRAVENOUS at 07:54

## 2021-07-16 RX ADMIN — FENTANYL CITRATE 100 MCG: 50 INJECTION INTRAMUSCULAR; INTRAVENOUS at 07:53

## 2021-07-16 RX ADMIN — PROPOFOL 200 MG: 10 INJECTION, EMULSION INTRAVENOUS at 07:53

## 2021-07-16 RX ADMIN — KETOROLAC TROMETHAMINE 30 MG: 30 INJECTION, SOLUTION INTRAMUSCULAR at 08:11

## 2021-07-16 RX ADMIN — ONDANSETRON 4 MG: 2 INJECTION INTRAMUSCULAR; INTRAVENOUS at 07:53

## 2021-07-16 RX ADMIN — MIDAZOLAM 2 MG: 1 INJECTION INTRAMUSCULAR; INTRAVENOUS at 07:50

## 2021-07-16 NOTE — ANESTHESIA POSTPROCEDURE EVALUATION
Post-Op Assessment Note    CV Status:  Stable  Pain Score: 0    Pain management: adequate     Mental Status:  Sleepy   PONV Controlled:  Controlled   Airway Patency:  Patent      Post Op Vitals Reviewed: Yes      Staff: CRNA         No complications documented      BP      Temp (P) 97 6 °F (36 4 °C) (07/16/21 0815)    Pulse (P) 67 (07/16/21 0815)   Resp      SpO2 (P) 99 % (07/16/21 0815)

## 2021-07-16 NOTE — DISCHARGE INSTRUCTIONS
Vasectomy   WHAT YOU NEED TO KNOW:   A vasectomy is a procedure to make you sterile  It is a permanent form of birth control  The vas deferens (sperm tubes) are cut so that the semen does not contain sperm  DISCHARGE INSTRUCTIONS:   Seek care immediately if:   · Your incision wound comes apart  · You see blood in your urine or semen  Contact your healthcare provider or surgeon if:   · You have a fever  · Your wound is red, swollen, or draining pus  · You feel pain or burning when you urinate  · You have worsening pain in your scrotum, even after you take medicine  · You have questions or concerns about your condition or care  Medicines:   · NSAIDs  help decrease swelling and pain or fever  This medicine is available with or without a doctor's order  NSAIDs can cause stomach bleeding or kidney problems in certain people  If you take blood thinner medicine, always ask your healthcare provider if NSAIDs are safe for you  Always read the medicine label and follow directions  · Take your medicine as directed  Contact your healthcare provider if you think your medicine is not helping or if you have side effects  Tell him or her if you are allergic to any medicine  Keep a list of the medicines, vitamins, and herbs you take  Include the amounts, and when and why you take them  Bring the list or the pill bottles to follow-up visits  Carry your medicine list with you in case of an emergency  Self-care:   · Care for your wound as directed  Do not shower for 24 hours  When you do shower, carefully wash the wound with soap and water  Pat the area dry gently  Do not swim or take a bath for at least 1 week  · Limit activity  for at least 2 days  Do not play sports, do yard work, or lift anything heavy for at least 2 weeks  Talk to your healthcare provider about when you can return to work  · Apply ice  on your scrotum for 15 to 20 minutes every hour for 2 days   Use an ice pack, or put crushed ice in a plastic bag  Cover it with a towel  Ice helps prevent tissue damage and decreases swelling and pain  · Wear an athletic supporter for at least 2 days  This will decrease pain and swelling, and protect your wound  Place a cushion such as a washcloth or small towel under your scrotum to elevate it  · Do not have sex for at least 1 week  You will not be sterile right away after a vasectomy  It will take time for all the sperm to be cleared from your body  You may need to ejaculate about 20 times or wait up to 3 months for the sperm to clear  Use another form of birth control during this time  Follow up with your healthcare provider as directed: Ask when to return to have your semen checked for sperm  Write down your questions so you remember to ask them during your visits  © Copyright [address] Information is for End User's use only and may not be sold, redistributed or otherwise used for commercial purposes  All illustrations and images included in CareNotes® are the copyrighted property of A D A M , Inc  or [facility] J Carlos Meraz   The above information is an  only  It is not intended as medical advice for individual conditions or treatments  Talk to your doctor, nurse or pharmacist before following any medical regimen to see if it is safe and effective for you

## 2021-07-16 NOTE — ANESTHESIA PREPROCEDURE EVALUATION
Procedure:  VASECTOMY (Bilateral Scrotum)    Relevant Problems   ANESTHESIA (within normal limits)      CARDIO (within normal limits)      ENDO (within normal limits)      GI/HEPATIC (within normal limits)      /RENAL (within normal limits)      GYN (within normal limits)      HEMATOLOGY (within normal limits)      MUSCULOSKELETAL (within normal limits)      NEURO/PSYCH   (+) Anxiety      PULMONARY (within normal limits)        Physical Exam    Airway    Mallampati score: I  TM Distance: >3 FB  Neck ROM: full     Dental       Cardiovascular  Cardiovascular exam normal    Pulmonary  Pulmonary exam normal     Other Findings        Anesthesia Plan  ASA Score- 2     Anesthesia Type- general and IV sedation with anesthesia with ASA Monitors  Additional Monitors:   Airway Plan:           Plan Factors-Exercise tolerance (METS): >4 METS  Chart reviewed  EKG reviewed  Imaging results reviewed  Existing labs reviewed  Patient summary reviewed  Patient is not a current smoker  Patient did not smoke on day of surgery  Obstructive sleep apnea risk education given perioperatively  Induction- intravenous  Postoperative Plan- Plan for postoperative opioid use  Planned trial extubation    Informed Consent- Anesthetic plan and risks discussed with patient  I personally reviewed this patient with the CRNA  Discussed and agreed on the Anesthesia Plan with the CRNA  Sherre Soulier

## 2021-07-19 NOTE — OP NOTE
OPERATIVE REPORT  PATIENT NAME: Lidya Oquendo    :  1990  MRN: [id number]  Pt Location: [facility] OR ROOM 03    SURGERY DATE: 2021    Surgeon(s) and Role:     * Felecia Yarbrough MD - Primary    Preop Diagnosis:  Encounter for sterilization [Z30 2]    Post-Op Diagnosis Codes:     * Encounter for sterilization [Z30 2]    Procedure(s) (LRB):  VASECTOMY (Bilateral)    Specimen(s):  ID Type Source Tests Collected by Time Destination   1 : Right Vas Tissue Vas Deferens, Right TISSUE EXAM Felecia Yarbrough MD 2021 [id number]    2 : Left Vas Tissue Vas Deferens, Left TISSUE EXAM Felecia Yarbrough MD 2021 [id number]        Estimated Blood Loss:   Minimal    Drains:  * No LDAs found *    Anesthesia Type:   General    Operative Indications:  Encounter for sterilization [Z30 2]      Operative Findings:  Bilateral vasectomy    Complications:   none  Procedure and Technique:  The patient was properly identified verbally and visually by myself and anesthesia  Time outs were appropriately done  He was given LMA anesthesia  He was placed in the supine position  The left and right vas were treated appropriately and similarly  The vas was brought up to the skin with the thumb and forefinger  The area was opened with a curved sharp instrument  The was vas was grasped with a ring forceps and brought through the incision  The 7 coverings of the vas were dissected off the vas  The vas was grasped and brought away from the underlying tissue  The vas site was excised and sent for pathology  The vas site was left to fall back into the depths of the scrotum  Both sides were done similarly  The scrotum was run with 4 0 vicryl and baseball sutures used  Neosporin ointment and a jock strap were applied  The patient tolerated the procedure well and left for the recovery room in good condition   Estimated blood loss 5 cs   I was present for the entire case    Patient Disposition:  Recovery room  SIGNATURE: Kianna Morales MD  DATE:  2021  TIME: 8:27 AM

## 2021-08-22 ENCOUNTER — OFFICE VISIT (OUTPATIENT)
Dept: URGENT CARE | Age: 31
End: 2021-08-22
Payer: COMMERCIAL

## 2021-08-22 ENCOUNTER — APPOINTMENT (OUTPATIENT)
Dept: RADIOLOGY | Age: 31
End: 2021-08-22
Payer: COMMERCIAL

## 2021-08-22 VITALS
BODY MASS INDEX: 30.35 KG/M2 | HEIGHT: 70 IN | SYSTOLIC BLOOD PRESSURE: 134 MMHG | HEART RATE: 69 BPM | DIASTOLIC BLOOD PRESSURE: 85 MMHG | RESPIRATION RATE: 18 BRPM | WEIGHT: 212 LBS | OXYGEN SATURATION: 98 % | TEMPERATURE: 97.8 F

## 2021-08-22 DIAGNOSIS — M25.531 RIGHT WRIST PAIN: ICD-10-CM

## 2021-08-22 DIAGNOSIS — S05.02XA ABRASION OF LEFT CORNEA, INITIAL ENCOUNTER: ICD-10-CM

## 2021-08-22 DIAGNOSIS — M65.4 TENOSYNOVITIS, DE QUERVAIN: Primary | ICD-10-CM

## 2021-08-22 PROCEDURE — 99213 OFFICE O/P EST LOW 20 MIN: CPT | Performed by: PHYSICIAN ASSISTANT

## 2021-08-22 RX ORDER — NAPROXEN 500 MG/1
500 TABLET ORAL 2 TIMES DAILY WITH MEALS
Qty: 30 TABLET | Refills: 0 | Status: SHIPPED | OUTPATIENT
Start: 2021-08-22

## 2021-08-22 RX ORDER — OFLOXACIN 3 MG/ML
1 SOLUTION/ DROPS OPHTHALMIC 4 TIMES DAILY
Qty: 5 ML | Refills: 0 | Status: SHIPPED | OUTPATIENT
Start: 2021-08-22

## 2021-08-22 NOTE — PROGRESS NOTES
[facility]        NAME: Eden Cagle is a 28 y o  male  : 1990    MRN: [id number]  DATE: 2021  TIME: 9:14 AM    Assessment and Plan   Tenosynovitis, de Quervain [M65 4]  1  Tenosynovitis, de Quervain  naproxen (NAPROSYN) 500 mg tablet    Ambulatory referral to Physical Therapy    CANCELED: XR wrist 3+ vw right   2  Abrasion of left cornea, initial encounter  ofloxacin (OCUFLOX) 0 3 % ophthalmic solution         Patient Instructions     Take naproxen as directed  Use eyedrops as directed for next 7 days  He no contacts until treatment is completed  Follow-up with physical therapy  Follow up with PCP in 3-5 days  Proceed to  ER if symptoms worsen  Chief Complaint     Chief Complaint   Patient presents with    Wrist Pain     Right Wrist Pain x 2-3 weeks, Left eye blurry vision x 4 days         History of Present Illness       Year old male presents with 2 complaints  First complaint is right wrist pain for the past 2-3 weeks  Patient is right handed  Reports that he does have repetitive use of right hand   Denies any falls or traumas to the wrist   Reports that he has been applying a thumb splint from time to time and take some Tylenol occasionally  Denies any numbness or tingling  No swelling or erythema  Second complaint that he has been having left eye discomfort and blurry vision  Patient reports symptoms started 4 days ago has been waxing and waning since then  Feels that have gotten a little bit better over the past couple days  Denies any drainage or discharge  Patient does wear contacts  Patient reports he has not worn his contacts the past 4 days and just been staying with his glasses  Up-to-date on tetanus    Wrist Pain   The pain is present in the right wrist  This is a new problem  The current episode started 1 to 4 weeks ago  There has been no history of extremity trauma  The problem occurs constantly  The problem has been waxing and waning   The quality of the pain is described as aching  The pain is moderate  Pertinent negatives include no fever, joint locking, joint swelling, limited range of motion, numbness, stiffness or tingling  The symptoms are aggravated by activity  He has tried acetaminophen for the symptoms  The treatment provided mild relief  Family history does not include gout  There is no history of osteoarthritis  Eye Pain   The left eye is affected  This is a new problem  The current episode started in the past 7 days  The problem occurs constantly  The problem has been waxing and waning  The injury mechanism was contact lenses  The pain is mild  There is no known exposure to pink eye  He wears contacts  Associated symptoms include blurred vision, eye redness and a foreign body sensation  Pertinent negatives include no eye discharge, double vision, fever, photophobia, recent URI, tingling or vomiting  He has tried nothing for the symptoms  The treatment provided no relief  Review of Systems   Review of Systems   Constitutional: Negative  Negative for fever  HENT: Negative  Eyes: Positive for blurred vision, pain and redness  Negative for double vision, photophobia and discharge  Respiratory: Negative  Cardiovascular: Negative  Gastrointestinal: Negative  Negative for vomiting  Musculoskeletal: Negative  Negative for stiffness  Skin: Negative  Neurological: Negative  Negative for tingling and numbness           Current Medications       Current Outpatient Medications:     Ascorbic Acid (vitamin C) 1000 MG tablet, Take 1,000 mg by mouth daily, Disp: , Rfl:     cetirizine (ZYRTEC ALLERGY) 10 mg tablet, Take 1 tablet by mouth daily, Disp: , Rfl:     Multiple Vitamins-Minerals (MULTIVITAMIN ADULT EXTRA C PO), Take 1 tablet by mouth daily, Disp: , Rfl:     Multiple Vitamins-Minerals (Vitamin D3 Complete) TABS, Take by mouth, Disp: , Rfl:     Triamcinolone Acetonide (NASACORT ALLERGY 24HR) 55 MCG/ACT AERO, 1 spray into each nostril daily, Disp: , Rfl:     VITAMIN D PO, Take 1,000 Units by mouth daily, Disp: , Rfl:     zinc gluconate 50 mg tablet, Take 50 mg by mouth daily, Disp: , Rfl:     ciprofloxacin (CIPRO) 500 mg tablet, Take 500 mg by mouth every 12 (twelve) hours For 2 days (Patient not taking: Reported on 8/22/2021), Disp: , Rfl:     naproxen (NAPROSYN) 500 mg tablet, Take 1 tablet (500 mg total) by mouth 2 (two) times a day with meals, Disp: 30 tablet, Rfl: 0    ofloxacin (OCUFLOX) 0 3 % ophthalmic solution, Administer 1 drop into the left eye 4 (four) times a day, Disp: 5 mL, Rfl: 0    Current Allergies     Allergies as of 08/22/2021 - Reviewed 08/22/2021   Allergen Reaction Noted    Amoxicillin Edema 09/25/2017    Other Abdominal Pain 03/19/2019            The following portions of the patient's history were reviewed and updated as appropriate: allergies, current medications, past family history, past medical history, past social history, past surgical history and problem list      Past Medical History:   Diagnosis Date    Allergic     Corneal ulcer     last assessed: 9/25/2017       Past Surgical History:   Procedure Laterality Date    NE REMOVAL OF SPERM DUCT(S) Bilateral 7/16/2021    Procedure: [place];  Surgeon: Chava Lakhani MD;  [facility]: [facility];  Service: Urology    WISDOM TOOTH EXTRACTION         Family History   Problem Relation Age of Onset    Heart disease Father     Heart disease Maternal Grandfather     COPD Paternal Grandmother     Lymphoma Paternal Grandfather          Medications have been verified  Objective   /85 (BP Location: Left arm, Patient Position: Sitting, Cuff Size: Standard)   Pulse 69   Temp 97 8 °F (36 6 °C) (Tympanic)   Resp 18   Ht 5' 10" (1 778 m)   Wt 96 2 kg (212 lb)   SpO2 98%   BMI 30 42 kg/m²   No LMP for male patient  Physical Exam     Physical Exam  Vitals and nursing note reviewed     Constitutional:       General: He is not in acute distress  Appearance: He is well-developed  HENT:      Head: Normocephalic and atraumatic  Right Ear: Hearing, tympanic membrane, ear canal and external ear normal       Left Ear: Hearing, tympanic membrane, ear canal and external ear normal       Nose: Nose normal       Mouth/Throat:      Pharynx: Uvula midline  No oropharyngeal exudate  Eyes:      General: Lids are normal  Vision grossly intact  Gaze aligned appropriately  Right eye: No foreign body, discharge or hordeolum  Left eye: No foreign body, discharge or hordeolum  Extraocular Movements: Extraocular movements intact  Conjunctiva/sclera: Conjunctivae normal       Left eye: Left conjunctiva is not injected  No chemosis, exudate or hemorrhage  Pupils:      Left eye: Pupil is round, reactive and not sluggish  Corneal abrasion and fluorescein uptake ( superficial corneal abrasion) present  Ro exam negative  Cardiovascular:      Rate and Rhythm: Normal rate and regular rhythm  Heart sounds: Normal heart sounds  No murmur heard  Pulmonary:      Effort: Pulmonary effort is normal  No respiratory distress  Breath sounds: Normal breath sounds  No wheezing or rales  Abdominal:      General: Bowel sounds are normal       Palpations: Abdomen is soft  Tenderness: There is no abdominal tenderness  Musculoskeletal:         General: Normal range of motion  Right wrist: Tenderness present  No swelling, deformity, effusion, lacerations, bony tenderness, snuff box tenderness or crepitus  Normal range of motion  Normal pulse  Cervical back: Normal range of motion and neck supple  Comments:  Positive Finkelstein test   Lymphadenopathy:      Cervical: No cervical adenopathy  Skin:     General: Skin is warm and dry  Neurological:      Mental Status: He is alert and oriented to person, place, and time  Started first trimester

## 2021-08-22 NOTE — PATIENT INSTRUCTIONS
Take naproxen as directed  Use eyedrops as directed for next 7 days  He no contacts until treatment is completed  Continue to utilize thumb brace  Follow-up with physical therapy  Follow up with PCP in 3-5 days  Proceed to  ER if symptoms worsen  Nida Camacho Disease   WHAT YOU NEED TO KNOW:   Flash Coreas disease is inflammation of the tendons on the thumb side of your wrist  Tendons are thick strands of tissue that connect muscles to bones  DISCHARGE INSTRUCTIONS:   Splint or brace: These devices will help decrease pain, limit movement, and protect your wrist so that it can heal  Make sure your device is comfortable  If it is too tight, your fingers may feel numb or tingly  Do not push or lean on your device because it can break  Physical or occupational therapy:  You may need to see a physical or occupational therapist to teach you special exercises  These exercises help improve movement and decrease pain  They also help improve strength and decrease your risk for loss of function  Therapists will help you make changes to your daily activities to decrease stress and pressure on the tendons  Rest:  Rest your injured thumb or wrist  Avoid twisting, grasping, or gripping movements  Ask when you can return to your normal activities  Medicines:   · NSAIDs:  These medicines decrease swelling, pain, and fever  They are available without a doctor's order  Ask which medicine is right for you, and how much to take  Take as directed  NSAIDs can cause stomach bleeding or kidney problems if not taken correctly  · Take your medicine as directed  Contact your healthcare provider if you think your medicine is not helping or if you have side effects  Tell him or her if you are allergic to any medicine  Keep a list of the medicines, vitamins, and herbs you take  Include the amounts, and when and why you take them  Bring the list or the pill bottles to follow-up visits   Carry your medicine list with you in case of an emergency  Follow up with your healthcare provider as directed:  Write down your questions so you remember to ask them during your visits  Contact your healthcare provider if:   · Your splint or brace is too tight and you cannot loosen it  · You have a fever  · Your pain and swelling get worse or do not go away  · Your cannot grasp objects because of the pain and swelling  · You have questions or concerns about your condition or care  Seek care immediately if:   · You cannot move your thumb or wrist     · Your fingers feel numb, tingly, cool to the touch, or look blue or pale  © Copyright [employer] 2021 Information is for End User's use only and may not be sold, redistributed or otherwise used for commercial purposes  All illustrations and images included in CareNotes® are the copyrighted property of A [employer] M [employer]  or [employer]   The above information is an  only  It is not intended as medical advice for individual conditions or treatments  Talk to your doctor, nurse or pharmacist before following any medical regimen to see if it is safe and effective for you  Corneal Abrasion   AMBULATORY CARE:   A corneal abrasion  is a scratch on the cornea of your eye  The cornea is the clear layer that covers the front of your eye  A small scratch may heal in 1 to 2 days  Deeper or larger scratches may take longer to heal  Do not touch or rub your eye while it is healing  Common signs and symptoms:   · Pain    · More tears than usual    · Redness    · A feeling that you have something in your eye    · Blurred vision    · Sensitivity to bright light    · Headache    Call your healthcare provider or ophthalmologist if:   · Your eye pain or vision gets worse  · You have yellow or green drainage from your eye  · You have questions or concerns about your condition or care      Treatment for a corneal abrasion  may include antibiotic eyedrops or ointment to help prevent an eye infection  You may also be given eyedrops to decrease pain  Care for your eyes:   · Get regular eye exams  Get your eyes checked at least every year  · Eat healthy foods  Fresh fruits and vegetables that are rich in vitamins A and C may help with your vision  Foods such as sweet potatoes, apricots, and carrots are rich in good nutrients for the eyes  · Take care of your contacts or glasses  Store, clean, and use your contacts or glasses as directed  Replace your glasses or contact lenses as often as your healthcare provider suggests  · Decrease eye strain  Rest your eyes, especially after you read or sew for long periods of time  Get plenty of sleep at night  Use lights that reduce glare in your home, school, or workplace  · Wear dark sunglasses  This will help prevent pain and light sensitivity  Make sure the sunglasses have UVA and UVB protection  This will protect your eyes when you go outside  · Use eyedrops safely  If your treatment plan includes eyedrops, it is important to use them as directed  Your provider may give you detailed instructions to follow  The eyedrops may also come with safety instructions  Follow all instructions to help prevent an infection  Do not touch the tip of the bottle to your eye  Germs from your eye can spread to the medicine bottle  Prevent corneal abrasions:   · Remove your contact lenses if your eyes feel dry or irritated  · Wash your hands if you need to touch your eyes or your face  · Trim your child's fingernails so he or she cannot scratch his or her eye  · Wear protective eyewear when you work with chemicals, wood, dust, or metal     · Wear protective eyewear when you play sports  · Do not wear your contacts for longer than you should  · Do not wear colored lenses or lenses with shapes on them  These lenses may cause eye damage and vision loss  · Do not wear glitter makeup   Glitter can easily get into your eyes and under contact lenses  · Do not sleep with your contacts in  Follow up with your healthcare provider as directed:  Write down your questions so you remember to ask them during your visits  © Copyright [employer] 2021 Information is for End User's use only and may not be sold, redistributed or otherwise used for commercial purposes  All illustrations and images included in CareNotes® are the copyrighted property of [employer]  or Ghazala Meraz   The above information is an  only  It is not intended as medical advice for individual conditions or treatments  Talk to your doctor, nurse or pharmacist before following any medical regimen to see if it is safe and effective for you

## 2021-10-16 ENCOUNTER — APPOINTMENT (OUTPATIENT)
Dept: LAB | Age: 31
End: 2021-10-16

## 2021-10-16 DIAGNOSIS — Z13.9 ENCOUNTER FOR SCREENING: ICD-10-CM

## 2021-10-16 LAB
ANION GAP SERPL CALCULATED.3IONS-SCNC: 6 MMOL/L (ref 4–13)
BUN SERPL-MCNC: 17 MG/DL (ref 5–25)
CALCIUM SERPL-MCNC: 9.2 MG/DL (ref 8.3–10.1)
CHLORIDE SERPL-SCNC: 109 MMOL/L (ref 100–108)
CHOLEST SERPL-MCNC: 172 MG/DL (ref 50–200)
CO2 SERPL-SCNC: 27 MMOL/L (ref 21–32)
CREAT SERPL-MCNC: 0.91 MG/DL (ref 0.6–1.3)
GFR SERPL CREATININE-BSD FRML MDRD: 113 ML/MIN/1.73SQ M
GLUCOSE P FAST SERPL-MCNC: 94 MG/DL (ref 65–99)
HDLC SERPL-MCNC: 37 MG/DL
LDLC SERPL CALC-MCNC: 120 MG/DL (ref 0–100)
NONHDLC SERPL-MCNC: 135 MG/DL
POTASSIUM SERPL-SCNC: 3.6 MMOL/L (ref 3.5–5.3)
SODIUM SERPL-SCNC: 142 MMOL/L (ref 136–145)
TRIGL SERPL-MCNC: 77 MG/DL

## 2021-10-16 PROCEDURE — 80048 BASIC METABOLIC PNL TOTAL CA: CPT

## 2021-10-16 PROCEDURE — 80061 LIPID PANEL: CPT

## 2021-10-16 PROCEDURE — 36415 COLL VENOUS BLD VENIPUNCTURE: CPT

## 2021-11-12 ENCOUNTER — APPOINTMENT (OUTPATIENT)
Dept: LAB | Facility: CLINIC | Age: 31
End: 2021-11-12
Payer: COMMERCIAL

## 2021-11-12 DIAGNOSIS — Z30.2 STERILIZATION: ICD-10-CM

## 2021-11-12 LAB
DEPRECATED CD4 CELLS/CD8 CELLS BLD: 4.5 ML
SPERM MOTILE SMN QL MICRO: NORMAL

## 2021-11-12 PROCEDURE — 89321 SEMEN ANAL SPERM DETECTION: CPT

## 2022-11-19 DIAGNOSIS — Z13.9 SCREENING FOR UNSPECIFIED CONDITION: ICD-10-CM

## 2022-11-19 LAB
ANION GAP SERPL CALCULATED.3IONS-SCNC: 8 MMOL/L (ref 4–13)
BUN SERPL-MCNC: 14 MG/DL (ref 5–25)
CALCIUM SERPL-MCNC: 9.8 MG/DL (ref 8.3–10.1)
CHLORIDE SERPL-SCNC: 105 MMOL/L (ref 96–108)
CHOLEST SERPL-MCNC: 163 MG/DL
CO2 SERPL-SCNC: 26 MMOL/L (ref 21–32)
CREAT SERPL-MCNC: 0.87 MG/DL (ref 0.6–1.3)
GFR SERPL CREATININE-BSD FRML MDRD: 114 ML/MIN/1.73SQ M
GLUCOSE P FAST SERPL-MCNC: 94 MG/DL (ref 65–99)
HDLC SERPL-MCNC: 33 MG/DL
LDLC SERPL CALC-MCNC: 114 MG/DL (ref 0–100)
NONHDLC SERPL-MCNC: 130 MG/DL
POTASSIUM SERPL-SCNC: 4.1 MMOL/L (ref 3.5–5.3)
SODIUM SERPL-SCNC: 139 MMOL/L (ref 135–147)
TRIGL SERPL-MCNC: 78 MG/DL

## 2023-08-22 ENCOUNTER — OFFICE VISIT (OUTPATIENT)
Dept: URGENT CARE | Facility: MEDICAL CENTER | Age: 33
End: 2023-08-22
Payer: COMMERCIAL

## 2023-08-22 VITALS
WEIGHT: 221 LBS | HEART RATE: 78 BPM | OXYGEN SATURATION: 98 % | HEIGHT: 69 IN | BODY MASS INDEX: 32.73 KG/M2 | DIASTOLIC BLOOD PRESSURE: 83 MMHG | RESPIRATION RATE: 18 BRPM | SYSTOLIC BLOOD PRESSURE: 167 MMHG | TEMPERATURE: 98 F

## 2023-08-22 DIAGNOSIS — S61.219A LACERATION WITHOUT FOREIGN BODY OF UNSPECIFIED FINGER WITHOUT DAMAGE TO NAIL, INITIAL ENCOUNTER: Primary | ICD-10-CM

## 2023-08-22 PROCEDURE — 99213 OFFICE O/P EST LOW 20 MIN: CPT | Performed by: PHYSICIAN ASSISTANT

## 2023-08-22 PROCEDURE — 90471 IMMUNIZATION ADMIN: CPT

## 2023-08-22 PROCEDURE — 90715 TDAP VACCINE 7 YRS/> IM: CPT

## 2023-08-22 NOTE — PROGRESS NOTES
[facility]        NAME: Darrell Nissen is a 29 y.o. male  : 1990    MRN: [id number]  DATE: 2023  TIME: 7:36 PM    Assessment and Plan   Laceration without foreign body of unspecified finger without damage to nail, initial encounter [S61.219A]  1. Laceration without foreign body of unspecified finger without damage to nail, initial encounter  Tdap Vaccine greater than or equal to 8yo            Patient Instructions     Laceration finger  Keep area clean and dry  Follow up with PCP in 3-5 days. Proceed to  ER if symptoms worsen. Chief Complaint     Chief Complaint   Patient presents with   • Laceration     Pt. Lacerated his left 2nd digit on metal tonight. History of Present Illness       36-year-old male who presents complaining of laceration to left finger. Patient states that he was opening a new product when there was a sharp edge around the edge and he cut his left second finger. Laceration         Review of Systems   Review of Systems   Constitutional: Negative. HENT: Negative. Eyes: Negative. Respiratory: Negative. Negative for apnea, cough, choking, chest tightness, shortness of breath, wheezing and stridor. Cardiovascular: Negative. Negative for chest pain. Skin: Positive for wound.          Current Medications       Current Outpatient Medications:   •  Ascorbic Acid (vitamin C) 1000 MG tablet, Take 1,000 mg by mouth daily, Disp: , Rfl:   •  cetirizine (ZYRTEC ALLERGY) 10 mg tablet, Take 1 tablet by mouth daily, Disp: , Rfl:   •  Multiple Vitamins-Minerals (MULTIVITAMIN ADULT EXTRA C PO), Take 1 tablet by mouth daily, Disp: , Rfl:   •  Multiple Vitamins-Minerals (Vitamin D3 Complete) TABS, Take by mouth, Disp: , Rfl:   •  VITAMIN D PO, Take 1,000 Units by mouth daily, Disp: , Rfl:   •  zinc gluconate 50 mg tablet, Take 50 mg by mouth daily, Disp: , Rfl:   •  ciprofloxacin (CIPRO) 500 mg tablet, Take 500 mg by mouth every 12 (twelve) hours For 2 days (Patient not taking: Reported on 8/22/2021), Disp: , Rfl:   •  naproxen (NAPROSYN) 500 mg tablet, Take 1 tablet (500 mg total) by mouth 2 (two) times a day with meals (Patient not taking: Reported on 8/22/2023), Disp: 30 tablet, Rfl: 0  •  ofloxacin (OCUFLOX) 0.3 % ophthalmic solution, Administer 1 drop into the left eye 4 (four) times a day (Patient not taking: Reported on 8/22/2023), Disp: 5 mL, Rfl: 0  •  Triamcinolone Acetonide (NASACORT ALLERGY 24HR) 55 MCG/ACT AERO, 1 spray into each nostril daily (Patient not taking: Reported on 8/22/2023), Disp: , Rfl:     Current Allergies     Allergies as of 08/22/2023 - Reviewed 08/22/2023   Allergen Reaction Noted   • Amoxicillin Edema 09/25/2017   • Other Abdominal Pain 03/19/2019            The following portions of the patient's history were reviewed and updated as appropriate: allergies, current medications, past family history, past medical history, past social history, past surgical history and problem list.     Past Medical History:   Diagnosis Date   • Allergic    • Corneal ulcer     last assessed: 9/25/2017       Past Surgical History:   Procedure Laterality Date   • NE REMOVAL OF SPERM DUCT(S) Bilateral 7/16/2021    Procedure: Awanda Lesches;  Surgeon: Shady Chao MD;  Location: [facility];  Service: Urology   • WISDOM TOOTH EXTRACTION         Family History   Problem Relation Age of Onset   • Heart disease Father    • Heart disease Maternal Grandfather    • COPD Paternal Grandmother    • Lymphoma Paternal Grandfather          Medications have been verified. Objective   /83   Pulse 78   Temp 98 °F (36.7 °C)   Resp 18   Ht 5' 9" (1.753 m)   Wt 100 kg (221 lb)   SpO2 98%   BMI 32.64 kg/m²        Physical Exam     Physical Exam  Constitutional:       General: He is not in acute distress. Appearance: He is well-developed. He is not diaphoretic. Cardiovascular:      Rate and Rhythm: Normal rate and regular rhythm.       Heart sounds: Normal heart sounds. Pulmonary:      Effort: Pulmonary effort is normal. No respiratory distress. Breath sounds: Normal breath sounds. No wheezing or rales. Chest:      Chest wall: No tenderness. Musculoskeletal:        Arms:       Cervical back: Normal range of motion and neck supple. Lymphadenopathy:      Cervical: No cervical adenopathy. Area cleaned and prepped in sterile fashion, anesthetized locally with 1% lidocaine, 1 cc used, 2 sutures  3 Ethilon placed.   Patient tolerated procedure well

## 2023-11-17 ENCOUNTER — APPOINTMENT (OUTPATIENT)
Dept: URGENT CARE | Facility: MEDICAL CENTER | Age: 33
End: 2023-11-17

## 2023-11-17 DIAGNOSIS — Z00.8 ENCOUNTER FOR BIOMETRIC SCREENING: ICD-10-CM

## 2023-11-17 LAB
ANION GAP SERPL CALCULATED.3IONS-SCNC: 11 MMOL/L
BUN SERPL-MCNC: 15 MG/DL (ref 5–25)
CALCIUM SERPL-MCNC: 9.5 MG/DL (ref 8.4–10.2)
CHLORIDE SERPL-SCNC: 101 MMOL/L (ref 96–108)
CO2 SERPL-SCNC: 28 MMOL/L (ref 21–32)
CREAT SERPL-MCNC: 0.88 MG/DL (ref 0.6–1.3)
GFR SERPL CREATININE-BSD FRML MDRD: 113 ML/MIN/1.73SQ M
GLUCOSE P FAST SERPL-MCNC: 75 MG/DL (ref 65–99)
POTASSIUM SERPL-SCNC: 3.5 MMOL/L (ref 3.5–5.3)
SODIUM SERPL-SCNC: 140 MMOL/L (ref 135–147)

## 2023-11-17 PROCEDURE — 80048 BASIC METABOLIC PNL TOTAL CA: CPT

## 2023-11-17 PROCEDURE — 80061 LIPID PANEL: CPT | Performed by: FAMILY MEDICINE

## 2024-10-26 ENCOUNTER — APPOINTMENT (OUTPATIENT)
Dept: URGENT CARE | Facility: MEDICAL CENTER | Age: 34
End: 2024-10-26

## 2024-10-26 PROCEDURE — 80061 LIPID PANEL: CPT | Performed by: FAMILY MEDICINE

## 2024-10-26 PROCEDURE — 80048 BASIC METABOLIC PNL TOTAL CA: CPT | Performed by: FAMILY MEDICINE

## (undated) DEVICE — 2000CC GUARDIAN II: Brand: GUARDIAN

## (undated) DEVICE — BETHLEHEM UNIVERSAL MINOR GEN: Brand: CARDINAL HEALTH

## (undated) DEVICE — SUT VICRYL 4-0 PS-2 18 IN J496G

## (undated) DEVICE — INTENDED FOR TISSUE SEPARATION, AND OTHER PROCEDURES THAT REQUIRE A SHARP SURGICAL BLADE TO PUNCTURE OR CUT.: Brand: BARD-PARKER SAFETY BLADES SIZE 15, STERILE

## (undated) DEVICE — SUT VICRYL 3-0 SH 27 IN J416H

## (undated) DEVICE — Device

## (undated) DEVICE — 3000CC GUARDIAN II: Brand: GUARDIAN

## (undated) DEVICE — PROXIMATE PLUS MD MULTI-DIRECTIONAL RELEASE SKIN STAPLERS CONTAINS 35 STAINLESS STEEL STAPLES APPROXIMATE CLOSED DIMENSIONS: 6.9MM X 3.9MM WIDE: Brand: PROXIMATE

## (undated) DEVICE — ROSEBUD DISSECTORS: Brand: DEROYAL

## (undated) DEVICE — GLOVE SRG BIOGEL 8

## (undated) DEVICE — SUT CHROMIC 2-0 SH 27 IN G123H

## (undated) DEVICE — POOLE SUCTION HANDLE W/TUBING: Brand: CARDINAL HEALTH

## (undated) DEVICE — ALL PURPOSE SPONGES,NONWOVEN, 4 PLY: Brand: CURITY

## (undated) DEVICE — PLUMEPEN PRO 10FT

## (undated) DEVICE — HEMOCLIP CARTRIDGE SM

## (undated) DEVICE — GAUZE SPONGES,16 PLY: Brand: CURITY

## (undated) DEVICE — SUT CHROMIC 0 V-34 36 IN 944H

## (undated) DEVICE — NEEDLE 25G X 1 1/2

## (undated) DEVICE — SUT VICRYL 4-0 RB-1 27 IN J214H

## (undated) DEVICE — SUT SILK 0 SH 24 IN K534H

## (undated) DEVICE — HEMOCLIP CARTRIDGE MED

## (undated) DEVICE — SUT CHROMIC 4-0 RB-1 27 IN U203H

## (undated) DEVICE — SUT PLAIN 3-0 CTX 27 IN 873H

## (undated) DEVICE — SUT SILK 3-0 SH 30 IN K832H